# Patient Record
Sex: FEMALE | Race: OTHER
[De-identification: names, ages, dates, MRNs, and addresses within clinical notes are randomized per-mention and may not be internally consistent; named-entity substitution may affect disease eponyms.]

---

## 2018-07-05 ENCOUNTER — HOSPITAL ENCOUNTER (EMERGENCY)
Dept: HOSPITAL 62 - ER | Age: 38
Discharge: HOME | End: 2018-07-05
Payer: MEDICAID

## 2018-07-05 VITALS — SYSTOLIC BLOOD PRESSURE: 125 MMHG | DIASTOLIC BLOOD PRESSURE: 78 MMHG

## 2018-07-05 DIAGNOSIS — S92.052A: Primary | ICD-10-CM
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
## 2018-07-05 DIAGNOSIS — F17.210: ICD-10-CM

## 2018-07-05 PROCEDURE — 99283 EMERGENCY DEPT VISIT LOW MDM: CPT

## 2018-07-05 PROCEDURE — 73630 X-RAY EXAM OF FOOT: CPT

## 2018-07-05 PROCEDURE — 99406 BEHAV CHNG SMOKING 3-10 MIN: CPT

## 2018-07-05 NOTE — ER DOCUMENT REPORT
ED Extremity Problem, Lower





- General


Chief Complaint: Toe Injury


Stated Complaint: LEFT PINKY TOE INJURY


Time Seen by Provider: 07/05/18 16:17


Mode of Arrival: Ambulatory


Information source: Patient


Notes: 





38-year-old female presents to ED for complaint of fifth toe pain after she 

kicked a 10 pound weight yesterday.  She states she is elevated and iced the 

foot with no relief.  Patient alert and oriented respirations regular and 

unlabored speaking in full sentences and walks with a limp.


TRAVEL OUTSIDE OF THE [place] IN LAST 30 DAYS: No





- HPI


Patient complains to provider of: Injury, Pain, Swelling


Location: 5th Toe


Occurred: Yesterday


Where: Home


Onset/Duration: Sudden, Persistent


Quality of pain: Achy, Throbbing


Severity: Moderate


Pain Level: 4


Context: Stubbed


Recent injury: Yes


Associated symptoms: Painful ambulation


Exacerbated by: Hanging down, Movement, Walking


Relieved by: Elevation, Ice, Rest





- Related Data


Allergies/Adverse Reactions: 


 





codeine [Codeine] Allergy (Verified 07/05/18 15:23)


 











Past Medical History





- General


Information source: Patient





- Social History


Smoking Status: Current Every Day Smoker


Cigarette use (# per day): Yes - One half pack per day


Smoking Education Provided: Yes - 4 minutes


Lives with: Family


Family History: Reviewed & Not Pertinent


Patient has suicidal ideation: No


Patient has homicidal ideation: No





- Past Medical History


Cardiac Medical History: Reports: None


Pulmonary Medical History: Reports: None


EENT Medical History: Reports: None


Neurological Medical History: Reports: None


Endocrine Medical History: Reports: None


Renal/ Medical History: Reports: Hx Ovarian Cysts


Malignancy Medical History: Reports: None


GI Medical History: Reports: None


Musculoskeltal Medical History: Reports Hx Musculoskeletal Trauma


Skin Medical History: Reports None


Psychiatric Medical History: Reports: None


Traumatic Medical History: Reports: Hx Fractures - Leg


Infectious Medical History: Reports: None


Past Surgical History: Reports: Hx Gynecologic Surgery - Ovarian cyst





- Immunizations


Hx Diphtheria, Pertussis, Tetanus Vaccination: Yes





Review of Systems





- Review of Systems


Musculoskeletal: Other - Pain swelling and bruising to the fifth toe





Physical Exam





- Vital signs


Vitals: 


 











Temp Pulse Resp BP Pulse Ox


 


 98.3 F   81   16   125/78   97 


 


 07/05/18 15:39  07/05/18 15:39  07/05/18 15:39  07/05/18 15:39  07/05/18 15:39











Interpretation: Normal





- General


General appearance: Appears well, Alert





- HEENT


Head: Normocephalic, Atraumatic


Eyes: Normal


Pupils: PERRL





- Respiratory


Respiratory status: No respiratory distress


Chest status: Nontender


Breath sounds: Normal


Chest palpation: Normal





- Cardiovascular


Rhythm: Regular


Heart sounds: Normal auscultation


Murmur: No





- Abdominal


Inspection: Normal


Distension: No distension


Bowel sounds: Normal


Tenderness: Nontender


Organomegaly: No organomegaly





- Back


Back: Normal, Nontender





- Extremities


General upper extremity: Normal inspection, Nontender, Normal color, Normal ROM

, Normal temperature


General lower extremity: Normal temperature.  No: David's sign


Foot: Tender, Ecchymosis, Edema, No evidence of FB, Tender 5th metatarsal, 

Other - Pain swelling bruising to the fifth toe on the left foot and the 

surrounding area.  No: Laceration, Metatarsal compress. pain, Nail injury, 

Navicular tenderness, Unable to bear weight





- Neurological


Neuro grossly intact: Yes


Cognition: Normal


Orientation: AAOx4


[place] Coma Scale Eye Opening: Spontaneous


[place] Coma Scale Verbal: Oriented


[place] Coma Scale Motor: Obeys Commands


[place] Coma Scale Total: 15


Speech: Normal


Motor strength normal: LUE, RUE, LLE, RLE


Sensory: Normal





- Psychological


Associated symptoms: Normal affect, Normal mood





- Skin


Skin Temperature: Warm


Skin Moisture: Dry


Skin Color: Normal, Ecchymosis - Fifth toe on the left foot and the surrounding 

foot





Course





- Re-evaluation


Re-evalutation: 





07/05/18 23:07


X-ray discussed with patient and written report given to patient.  Patient was 

treated with buddy tape and the fifth toe to the fourth.  Patient was 

encouraged to use elevation ice for her pain.  Patient was given a small 

prescription of narcotics.  Patient is instructed to follow-up with a 

podiatrist for this fractured toe.





- Vital Signs


Vital signs: 


 











Temp Pulse Resp BP Pulse Ox


 


 98.3 F   81   16   125/78   97 


 


 07/05/18 15:39  07/05/18 15:39  07/05/18 15:39  07/05/18 15:39  07/05/18 15:39














- Diagnostic Test


Radiology reviewed: Image reviewed, Reports reviewed





Discharge





- Discharge


Clinical Impression: 


Fracture of fifth toe, left, closed


Qualifiers:


 Encounter type: initial encounter Qualified Code(s): S92.502A - Displaced 

unspecified fracture of left lesser toe(s), initial encounter for closed 

fracture





Condition: Stable


Disposition: HOME, SELF-CARE


Additional Instructions: 


Fractured Toe





     You have fractured your toe.  Although this fracture doesn't need a cast 

or splint, emergency evaluation was needed to assess the straightness of the 

bones and joints.  Reduction ("setting") is necessary for toe fractures which 

are crooked or twisted.  A toe fracture will heal in about three weeks.


     Usually, the fractured toe is taped to the next toe.  The second toe acts 

as a moving splint to protect the broken one.  Ice and elevation help during 

the first 48 hours.  You may need crutches at first if walking is painful.  

When you begin walking, be careful NOT to do things that hurt.  If weight 

bearing is not comfortable within a few days, you may require a special shoe, 

walking boot, or cast.


     Call the doctor or return at once if severe swelling, severe pain, or 

numbness develop in the toe, or if you suspect you may have re-injured it.





Buddy Taping





     Your toes have been taped together -- called "buddy taping."  The good toe 

can act as a moving splint to protect the injured toe.  You will probably need 

to keep the tape in place (replacing it when needed) for about three weeks.  A 

firm shoe over the injured toes is usually a good idea.


     As a general rule, you shouldn't do anything which causes pain to your 

taped toes.  Taping isn't absolute protection, so match your activity to your 

degree of healing.  If you ever suspect that you have re-injured the toe, 

return for re-examination.


     Keep the tape dry.  Constant wetness harms the skin.  Some cotton between 

the toes may help if perspiration is a problem.  Replace the tape as needed 

when it becomes loose, weak, or dirty.  Replace the tape daily if you are 

sweating.


     If the toes swell, discolor, or become numb, loosen the tape. Return here 

if there are problems.





Ibuprofen





     Ibuprofen is an excellent, safe drug for pain control.  In addition, it 

has potent antiinflammatory effects which are beneficial, especially in the 

treatment of injuries, arthritis, or tendonitis. It's best to take ibuprofen 

with food.  Persons with ulcer disease or allergy to aspirin should notify 

their physician of this before taking ibuprofen.


     Take the medication exactly as prescribed.  Don't take additional doses 

unless instructed to do so by your doctor.  If you develop wheezing, shortness 

of breath, hives, faintness, stomach pain, vomiting, or dark black stools, 

return for re-evaluation at once.





ICE & ELEVATION:


     Apply ice packs frequently against the painful area.  Many different 

schedules are recommended, such as "20 minutes on, 20 minutes off" or "one hour 

ice, two hours rest."  If you need to work, you may need to go longer between 

ice treatments.  You should plan to have the area ice packed AT LEAST one-

fourth of the time.


     The ice should be applied over the wrap, tape, or splint, or over a layer 

of cloth -- not directly against the skin.  Some ice bags have a built-in cloth 

and can be put directly on the skin.


     Your injured part should be elevated as much as possible over the next 48 

hours.  Try to keep the injury above the level of the heart. Avoid use of the 

injured area.  Elevation and rest will decrease the swelling.





Oral Narcotic Medication





     You have been given a prescription for pain control.  This medication is a 

narcotic.  It's best taken with food, as nausea can result if taken on an empty 

stomach.


     Don't operate machinery or drive within six hours of taking this 

medication.  Do not combine this medicine with alcohol, or with any medication 

which can cause sedation (such as cold tablets or sleeping pills) unless you 

get permission from the physician.


     Narcotics tend to cause constipation.  If possible, drink plenty of fluids 

and eat a diet high in fiber and fruits.








FOLLOW-UP CARE:


If you have been referred to a physician for follow-up care, call the physician

s office for an appointment as you were instructed or within the next two days.

  If you experience worsening or a significant change in your symptoms, notify 

the physician immediately or return to the Emergency Department at any time for 

re-evaluation.


Prescriptions: 


Hydrocodone/Acetaminophen [Norco 5-325 mg Tablet] 1 tab PO Q6HP PRN #6 tablet


 PRN Reason: 


Ibuprofen 600 mg PO Q6HP PRN #20 tablet


 PRN Reason: 


Forms:  Smoking Cessation Education


Referrals: 


ILANA ARRIAGA DPM [ACTIVE STAFF] - Follow up as needed

## 2018-07-05 NOTE — RADIOLOGY REPORT (SQ)
EXAM DESCRIPTION:  FOOT LEFT COMPLETE



COMPLETED DATE/TIME:  7/5/2018 4:35 pm



REASON FOR STUDY:  pain 5th toe and foot



COMPARISON:  None.



NUMBER OF VIEWS:  Three views.



TECHNIQUE:  AP, lateral and oblique  radiographic images acquired of the left foot.



LIMITATIONS:  None.



FINDINGS:  MINERALIZATION: Normal.

BONES: Faint nondisplaced fracture midshaft of the proximal phalanx of the 5th toe.

JOINTS: No effusions.

SOFT TISSUES: Soft tissue swelling 5th toe.

OTHER: No other significant finding.



IMPRESSION:  Faint nondisplaced fracture proximal phalanx 5th toe.



TECHNICAL DOCUMENTATION:  JOB ID:  [id number]

 2011 [employer]- All Rights Reserved



Reading location - IP/workstation name: [email or web address]

## 2018-08-22 ENCOUNTER — HOSPITAL ENCOUNTER (EMERGENCY)
Dept: HOSPITAL 62 - ER | Age: 38
Discharge: HOME | End: 2018-08-22
Payer: MEDICAID

## 2018-08-22 VITALS — DIASTOLIC BLOOD PRESSURE: 78 MMHG | SYSTOLIC BLOOD PRESSURE: 121 MMHG

## 2018-08-22 DIAGNOSIS — F17.210: ICD-10-CM

## 2018-08-22 DIAGNOSIS — R07.9: ICD-10-CM

## 2018-08-22 DIAGNOSIS — R19.7: ICD-10-CM

## 2018-08-22 DIAGNOSIS — Z88.6: ICD-10-CM

## 2018-08-22 DIAGNOSIS — F41.9: ICD-10-CM

## 2018-08-22 DIAGNOSIS — R00.2: Primary | ICD-10-CM

## 2018-08-22 LAB
ADD MANUAL DIFF: NO
ALBUMIN SERPL-MCNC: 4.5 G/DL (ref 3.5–5)
ALP SERPL-CCNC: 62 U/L (ref 38–126)
ALT SERPL-CCNC: 35 U/L (ref 9–52)
ANION GAP SERPL CALC-SCNC: 14 MMOL/L (ref 5–19)
APPEARANCE UR: (no result)
APTT PPP: YELLOW S
AST SERPL-CCNC: 33 U/L (ref 14–36)
BASOPHILS # BLD AUTO: 0.1 10^3/UL (ref 0–0.2)
BASOPHILS NFR BLD AUTO: 0.7 % (ref 0–2)
BILIRUB DIRECT SERPL-MCNC: 0.2 MG/DL (ref 0–0.4)
BILIRUB SERPL-MCNC: 0.6 MG/DL (ref 0.2–1.3)
BILIRUB UR QL STRIP: NEGATIVE
BUN SERPL-MCNC: 7 MG/DL (ref 7–20)
CALCIUM: 9.6 MG/DL (ref 8.4–10.2)
CHLORIDE SERPL-SCNC: 108 MMOL/L (ref 98–107)
CK MB SERPL-MCNC: 0.9 NG/ML (ref ?–4.55)
CK SERPL-CCNC: 228 U/L (ref 30–135)
CO2 SERPL-SCNC: 21 MMOL/L (ref 22–30)
EOSINOPHIL # BLD AUTO: 0.1 10^3/UL (ref 0–0.6)
EOSINOPHIL NFR BLD AUTO: 1.8 % (ref 0–6)
ERYTHROCYTE [DISTWIDTH] IN BLOOD BY AUTOMATED COUNT: 15.8 % (ref 11.5–14)
FREE T4 (FREE THYROXINE): 0.85 NG/DL (ref 0.78–2.19)
GLUCOSE SERPL-MCNC: 94 MG/DL (ref 75–110)
GLUCOSE UR STRIP-MCNC: NEGATIVE MG/DL
HCT VFR BLD CALC: 43.7 % (ref 36–47)
HGB BLD-MCNC: 14.9 G/DL (ref 12–15.5)
KETONES UR STRIP-MCNC: NEGATIVE MG/DL
LYMPHOCYTES # BLD AUTO: 3.1 10^3/UL (ref 0.5–4.7)
LYMPHOCYTES NFR BLD AUTO: 40.6 % (ref 13–45)
MCH RBC QN AUTO: 27.6 PG (ref 27–33.4)
MCHC RBC AUTO-ENTMCNC: 34.1 G/DL (ref 32–36)
MCV RBC AUTO: 81 FL (ref 80–97)
MONOCYTES # BLD AUTO: 0.5 10^3/UL (ref 0.1–1.4)
MONOCYTES NFR BLD AUTO: 6.4 % (ref 3–13)
NEUTROPHILS # BLD AUTO: 3.8 10^3/UL (ref 1.7–8.2)
NEUTS SEG NFR BLD AUTO: 50.5 % (ref 42–78)
NITRITE UR QL STRIP: NEGATIVE
PH UR STRIP: 6 [PH] (ref 5–9)
PHOSPHATE SERPL-MCNC: 4.2 MG/DL (ref 2.5–4.5)
PLATELET # BLD: 268 10^3/UL (ref 150–450)
POTASSIUM SERPL-SCNC: 4.3 MMOL/L (ref 3.6–5)
PROT SERPL-MCNC: 7.7 G/DL (ref 6.3–8.2)
PROT UR STRIP-MCNC: NEGATIVE MG/DL
RBC # BLD AUTO: 5.41 10^6/UL (ref 3.72–5.28)
SODIUM SERPL-SCNC: 143.3 MMOL/L (ref 137–145)
SP GR UR STRIP: 1.02
T3FREE SERPL-MCNC: 3.77 PG/ML (ref 2.77–5.27)
TOTAL CELLS COUNTED % (AUTO): 100 %
TROPONIN I SERPL-MCNC: < 0.012 NG/ML
TSH SERPL-ACNC: 1.76 UIU/ML (ref 0.47–4.68)
UROBILINOGEN UR-MCNC: NEGATIVE MG/DL (ref ?–2)
WBC # BLD AUTO: 7.6 10^3/UL (ref 4–10.5)

## 2018-08-22 PROCEDURE — 84484 ASSAY OF TROPONIN QUANT: CPT

## 2018-08-22 PROCEDURE — 71046 X-RAY EXAM CHEST 2 VIEWS: CPT

## 2018-08-22 PROCEDURE — 99406 BEHAV CHNG SMOKING 3-10 MIN: CPT

## 2018-08-22 PROCEDURE — 93005 ELECTROCARDIOGRAM TRACING: CPT

## 2018-08-22 PROCEDURE — 84481 FREE ASSAY (FT-3): CPT

## 2018-08-22 PROCEDURE — 82550 ASSAY OF CK (CPK): CPT

## 2018-08-22 PROCEDURE — 85379 FIBRIN DEGRADATION QUANT: CPT

## 2018-08-22 PROCEDURE — 82553 CREATINE MB FRACTION: CPT

## 2018-08-22 PROCEDURE — 80053 COMPREHEN METABOLIC PANEL: CPT

## 2018-08-22 PROCEDURE — 84439 ASSAY OF FREE THYROXINE: CPT

## 2018-08-22 PROCEDURE — 99285 EMERGENCY DEPT VISIT HI MDM: CPT

## 2018-08-22 PROCEDURE — 84100 ASSAY OF PHOSPHORUS: CPT

## 2018-08-22 PROCEDURE — 36415 COLL VENOUS BLD VENIPUNCTURE: CPT

## 2018-08-22 PROCEDURE — 93010 ELECTROCARDIOGRAM REPORT: CPT

## 2018-08-22 PROCEDURE — 84443 ASSAY THYROID STIM HORMONE: CPT

## 2018-08-22 PROCEDURE — 94640 AIRWAY INHALATION TREATMENT: CPT

## 2018-08-22 PROCEDURE — 81001 URINALYSIS AUTO W/SCOPE: CPT

## 2018-08-22 PROCEDURE — 87088 URINE BACTERIA CULTURE: CPT

## 2018-08-22 PROCEDURE — 87086 URINE CULTURE/COLONY COUNT: CPT

## 2018-08-22 PROCEDURE — 81025 URINE PREGNANCY TEST: CPT

## 2018-08-22 PROCEDURE — 85025 COMPLETE CBC W/AUTO DIFF WBC: CPT

## 2018-08-22 PROCEDURE — 83735 ASSAY OF MAGNESIUM: CPT

## 2018-08-22 NOTE — ER DOCUMENT REPORT
ED General





- General


Chief Complaint: Chest Pain


Stated Complaint: CHEST PAIN


Time Seen by Provider: 08/22/18 14:32


Mode of Arrival: Ambulatory


Information source: Patient, Replaced by [facility] Records


Notes: 





38-year-old otherwise healthy  female presents to the emergency 

department with chief complaint of constant chest pressure for the past 3 days.

  Patient states today at 1:30 AM she woke from sleep with chest pressure, 

palpitations and "adrenaline". Denies any radiation, pain worse with lying 

flat. Asssociated diarrhea and loss of appetite.  Patient states diarrhea 

started 3 days prior to arrival.  She states that she has had over 5-6 

nonbloody episodes without associated abdominal pain or nausea.  Patient denies 

sick contacts, recent illness, recent antibiotic use.  She denies recent surgery

, estrogen use, history of PE or DVT.  Current 1/2 ppd smoker, denies drug or 

alcohol use. Denies recent illness, shortness of breath, diaphoresis, LE 

swelling/pain, abdominal pain, nausea, vomiting, syncope, headache.  Patient 

does admit to increased stress with recently being homeless, jobless.  She is 

living in a motel at this time.


TRAVEL OUTSIDE OF THE [place] IN LAST 30 DAYS: No





- HPI


Onset: Other


Onset/Duration: Gradual, Persistent, Worse


Quality of pain: Pressure


Severity: Mild


Associated symptoms: Chest pain, Diarrhea.  denies: Fever, Nausea, Vomiting, 

Shortness of breath


Exacerbated by: Supine


Relieved by: Denies


Similar symptoms previously: No


Recently seen / treated by doctor: No





- Related Data


Allergies/Adverse Reactions: 


 





codeine [Codeine] Allergy (Verified 08/22/18 13:17)


 











Past Medical History





- General


Information source: Patient





- Social History


Smoking Status: Current Every Day Smoker


Cigarette use (# per day): Yes - 10


Chew tobacco use (# tins/day): No


Smoking Education Provided: Yes - 4 minutes of smoking cessation provided


Frequency of alcohol use: Occasional


Drug Abuse: None


Lives with: Family


Family History: Reviewed & Not Pertinent


Patient has suicidal ideation: No


Patient has homicidal ideation: No





- Medical History


Medical History: Negative


Renal/ Medical History: Reports: Hx Ovarian Cysts.  Denies: Hx Peritoneal 

Dialysis


Musculoskeletal Medical History: Reports Hx Musculoskeletal Trauma


Traumatic Medical History: Reports: Hx Fractures - Leg


Past Surgical History: Reports: Hx Gynecologic Surgery - Ovarian cyst





- Immunizations


Hx Diphtheria, Pertussis, Tetanus Vaccination: Yes





Review of Systems





- Review of Systems


Notes: 





REVIEW OF SYSTEMS:


CONSTITUTIONAL :  Denies fever,  chills, or sweats.  Denies recent illness. 

Denies weight loss, recent hospitalizations. 


EENT: Denies visual changes, eye pain.    Denies nasal or sinus congestion or 

discharge.  Denies sore throat, oral lesions, difficulty swallowing.


CARDIOVASCULAR:   Denies lower extremity edema.


RESPIRATORY:  Denies cough, cold, or chest congestion.  Denies shortness of 

breath, wheezing.


GASTROINTESTINAL:  Denies abdominal pain or distention.  Denies nausea, 

vomiting.  Denies blood in vomitus, stools, or per rectum.  Denies black, tarry 

stools.  Denies constipation.  


GENITOURINARY:  Denies difficulty urinating, painful urination,  frequency, 

blood in urine, or  vaginal discharge.


MUSCULOSKELETAL:  Denies back or neck pain or stiffness.  Denies joint pain or 

swelling.


SKIN:   Denies rash, lesions or sores.


HEMATOLOGIC :   Denies easy bruising or bleeding.


LYMPHATIC:  Denies swollen glands.


NEUROLOGICAL:  Denies confusion or altered mental status.  Denies passing out 

or loss of consciousness.  Denies dizziness or lightheadedness.  Denies 

headache.  Denies weakness or paralysis.  Denies problems difficulty with 

ambulation, slurred speech.  Denies sensory loss, numbness, or tingling.  

Denies seizures.


PSYCHIATRIC:  Denies anxiety or stress.  Denies depression, suicidal ideation, 

or homicidal ideation.  Denies visual or auditory hallucinations.








Physical Exam





- Vital signs


Vitals: 


 











Temp Pulse Resp BP Pulse Ox


 


 98.3 F   102 H  18   132/97 H  98 


 


 08/22/18 13:26  08/22/18 13:26  08/22/18 13:26  08/22/18 13:26  08/22/18 13:26














- Notes


Notes: 





PHYSICAL EXAMINATION:





GENERAL: Well-appearing, well-nourished and in no acute distress.





HEAD: Atraumatic, normocephalic.





EYES: Pupils equal round and reactive to light, extraocular movements intact, 

conjunctiva are normal.





ENT: Nares patent, oropharynx clear without exudates.  Moist mucous membranes.





NECK: Normal range of motion, supple without lymphadenopathy





LUNGS: Breath sounds clear to auscultation bilaterally and equal.  No wheezes 

rales or rhonchi.





HEART: Regular rate and rhythm without murmurs





ABDOMEN: Soft, nontender, nondistended abdomen.  No guarding, no rebound.  No 

masses appreciated.





Female : deferred





Musculoskeletal: Normal range of motion, no pitting or edema.  No cyanosis.





NEUROLOGICAL: Cranial nerves grossly intact.  Normal speech, normal gait.  

Normal sensory, motor exams





PSYCH: Normal mood, normal affect.





SKIN: Warm, Dry, normal turgor, no rashes or lesions noted.





Course





- Re-evaluation


Re-evalutation: 





08/22/18 17:50





Laboratory











  08/22/18 08/22/18 08/22/18





  14:20 14:20 14:20


 


WBC  7.6  


 


RBC  5.41 H  


 


Hgb  14.9  


 


Hct  43.7  


 


MCV  81  


 


MCH  27.6  


 


MCHC  34.1  


 


RDW  15.8 H  


 


Plt Count  268  


 


Seg Neutrophils %  50.5  


 


Lymphocytes %  40.6  


 


Monocytes %  6.4  


 


Eosinophils %  1.8  


 


Basophils %  0.7  


 


Absolute Neutrophils  3.8  


 


Absolute Lymphocytes  3.1  


 


Absolute Monocytes  0.5  


 


Absolute Eosinophils  0.1  


 


Absolute Basophils  0.1  


 


D-Dimer   


 


Sodium   143.3 


 


Potassium   4.3 


 


Chloride   108 H 


 


Carbon Dioxide   21 L 


 


Anion Gap   14 


 


BUN   7 


 


Creatinine   0.66 


 


Est GFR ( Amer)   > 60 


 


Est GFR (Non-Af Amer)   > 60 


 


Glucose   94 


 


Calcium   9.6 


 


Phosphorus   


 


Magnesium   


 


Total Bilirubin   0.6 


 


Direct Bilirubin   0.2 


 


Neonat Total Bilirubin   Not Reportable 


 


Neonat Direct Bilirubin   Not Reportable 


 


Neonat Indirect Bili   Not Reportable 


 


AST   33 


 


ALT   35 


 


Alkaline Phosphatase   62 


 


Creatine Kinase   228 H 


 


CK-MB (CK-2)    0.90


 


Troponin I    < 0.012


 


Total Protein   7.7 


 


Albumin   4.5 


 


TSH   


 


Free T4   


 


Free T3 pg/mL   


 


Urine Color   


 


Urine Appearance   


 


Urine pH   


 


Ur Specific Gravity   


 


Urine Protein   


 


Urine Glucose (UA)   


 


Urine Ketones   


 


Urine Blood   


 


Urine Nitrite   


 


Urine Bilirubin   


 


Urine Urobilinogen   


 


Ur Leukocyte Esterase   


 


Urine WBC (Auto)   


 


Urine RBC (Auto)   


 


Urine Bacteria (Auto)   


 


Squamous Epi Cells Auto   


 


Urine Mucus (Auto)   


 


Urine Ascorbic Acid   


 


Urine HCG, Qual   














  08/22/18 08/22/18 08/22/18





  14:20 14:20 14:20


 


WBC   


 


RBC   


 


Hgb   


 


Hct   


 


MCV   


 


MCH   


 


MCHC   


 


RDW   


 


Plt Count   


 


Seg Neutrophils %   


 


Lymphocytes %   


 


Monocytes %   


 


Eosinophils %   


 


Basophils %   


 


Absolute Neutrophils   


 


Absolute Lymphocytes   


 


Absolute Monocytes   


 


Absolute Eosinophils   


 


Absolute Basophils   


 


D-Dimer  0.32  


 


Sodium   


 


Potassium   


 


Chloride   


 


Carbon Dioxide   


 


Anion Gap   


 


BUN   


 


Creatinine   


 


Est GFR ( Amer)   


 


Est GFR (Non-Af Amer)   


 


Glucose   


 


Calcium   


 


Phosphorus    4.2


 


Magnesium    2.1


 


Total Bilirubin   


 


Direct Bilirubin   


 


Neonat Total Bilirubin   


 


Neonat Direct Bilirubin   


 


Neonat Indirect Bili   


 


AST   


 


ALT   


 


Alkaline Phosphatase   


 


Creatine Kinase   


 


CK-MB (CK-2)   


 


Troponin I   


 


Total Protein   


 


Albumin   


 


TSH   1.76 


 


Free T4   0.85 


 


Free T3 pg/mL   3.77 


 


Urine Color   


 


Urine Appearance   


 


Urine pH   


 


Ur Specific Gravity   


 


Urine Protein   


 


Urine Glucose (UA)   


 


Urine Ketones   


 


Urine Blood   


 


Urine Nitrite   


 


Urine Bilirubin   


 


Urine Urobilinogen   


 


Ur Leukocyte Esterase   


 


Urine WBC (Auto)   


 


Urine RBC (Auto)   


 


Urine Bacteria (Auto)   


 


Squamous Epi Cells Auto   


 


Urine Mucus (Auto)   


 


Urine Ascorbic Acid   


 


Urine HCG, Qual   














  08/22/18





  17:00


 


WBC 


 


RBC 


 


Hgb 


 


Hct 


 


MCV 


 


MCH 


 


MCHC 


 


RDW 


 


Plt Count 


 


Seg Neutrophils % 


 


Lymphocytes % 


 


Monocytes % 


 


Eosinophils % 


 


Basophils % 


 


Absolute Neutrophils 


 


Absolute Lymphocytes 


 


Absolute Monocytes 


 


Absolute Eosinophils 


 


Absolute Basophils 


 


D-Dimer 


 


Sodium 


 


Potassium 


 


Chloride 


 


Carbon Dioxide 


 


Anion Gap 


 


BUN 


 


Creatinine 


 


Est GFR ( Amer) 


 


Est GFR (Non-Af Amer) 


 


Glucose 


 


Calcium 


 


Phosphorus 


 


Magnesium 


 


Total Bilirubin 


 


Direct Bilirubin 


 


Neonat Total Bilirubin 


 


Neonat Direct Bilirubin 


 


Neonat Indirect Bili 


 


AST 


 


ALT 


 


Alkaline Phosphatase 


 


Creatine Kinase 


 


CK-MB (CK-2) 


 


Troponin I 


 


Total Protein 


 


Albumin 


 


TSH 


 


Free T4 


 


Free T3 pg/mL 


 


Urine Color  YELLOW


 


Urine Appearance  SLIGHTLY-CLOUDY


 


Urine pH  6.0


 


Ur Specific Gravity  1.020


 


Urine Protein  NEGATIVE


 


Urine Glucose (UA)  NEGATIVE


 


Urine Ketones  NEGATIVE


 


Urine Blood  SMALL H


 


Urine Nitrite  NEGATIVE


 


Urine Bilirubin  NEGATIVE


 


Urine Urobilinogen  NEGATIVE


 


Ur Leukocyte Esterase  SMALL H


 


Urine WBC (Auto)  15


 


Urine RBC (Auto)  3


 


Urine Bacteria (Auto)  TRACE


 


Squamous Epi Cells Auto  4


 


Urine Mucus (Auto)  MANY


 


Urine Ascorbic Acid  NEGATIVE


 


Urine HCG, Qual  NEGATIVE











08/22/18 17:57


Discussed findings of her urinalysis but patient has no urinary symptoms at 

this time.  She is agreeable with holding antibiotics awaiting urine culture.  

Patient will be discharged home in stable condition with a prescription for 

Vistaril.





- Vital Signs


Vital signs: 


 











Temp Pulse Resp BP Pulse Ox


 


 98.3 F   104 H  24 H  116/93 H  98 


 


 08/22/18 13:26  08/22/18 14:37  08/22/18 17:36  08/22/18 17:36  08/22/18 17:36














- Laboratory


Result Diagrams: 


 08/22/18 14:20





 08/22/18 14:20


Laboratory results interpreted by me: 


 











  08/22/18 08/22/18 08/22/18





  14:20 14:20 17:00


 


RBC  5.41 H  


 


RDW  15.8 H  


 


Chloride   108 H 


 


Carbon Dioxide   21 L 


 


Creatine Kinase   228 H 


 


Urine Blood    SMALL H


 


Ur Leukocyte Esterase    SMALL H














- Diagnostic Test


Radiology reviewed: Image reviewed, Reports reviewed





- EKG Interpretation by Me


EKG shows normal: Sinus rhythm


Rate: Tachycardia


Rhythm: NSR


When compared to previous EKG there are: Previous EKG unavailable





Discharge





- Discharge


Clinical Impression: 


 Heart palpitations, Anxiety, Chest pressure





Diarrhea


Qualifiers:


 Diarrhea type: unspecified type Qualified Code(s): R19.7 - Diarrhea, 

unspecified





Condition: Good


Disposition: HOME, SELF-CARE


Instructions:  Chest Pain of Unclear Cause ([facility]), Palpitations (Irregular or 

Rapid Heartrate) ([facility]), Anxiety ([facility])


Additional Instructions: 


Your blood work today including CBC, thyroid hormone, cardiac enzymes were all 

within normal limits.  You have no electrolyte abnormalities.  Your EKG was 

also normal.  Your chest x-ray showed no evidence of infection.


Prescriptions: 


Hydroxyzine Pamoate [Vistaril 50 mg Capsule] 50 mg PO Q8H PRN #15 capsule


 PRN Reason: Anxiety


Forms:  Smoking Cessation Education

## 2018-08-22 NOTE — EKG REPORT
SEVERITY:- OTHERWISE NORMAL ECG -

SINUS TACHYCARDIA

:

Confirmed by: Dashawn Gill 22-Aug-2018 22:14:29

## 2018-08-22 NOTE — RADIOLOGY REPORT (SQ)
EXAM DESCRIPTION:  CHEST 2 VIEWS



COMPLETED DATE/TIME:  8/22/2018 5:34 pm



REASON FOR STUDY:  [id number]



COMPARISON:  None.



EXAM PARAMETERS:  NUMBER OF VIEWS: two views

TECHNIQUE: Digital Frontal and Lateral radiographic views of the chest acquired.

RADIATION DOSE: NA

LIMITATIONS: none



FINDINGS:  LUNGS AND PLEURA: No opacities, masses or pneumothorax. No pleural effusion.

MEDIASTINUM AND HILAR STRUCTURES: No masses or contour abnormalities.

HEART AND VASCULAR STRUCTURES: Heart normal size.  No evidence for failure.

BONES: No acute findings.

HARDWARE: None in the chest.

OTHER: No other significant finding.



IMPRESSION:  NO ACUTE RADIOGRAPHIC FINDING IN THE CHEST.



TECHNICAL DOCUMENTATION:  JOB ID:  [id number]

 2011 [employer]- All Rights Reserved



Reading location - IP/workstation name: [email or web address]

## 2018-08-22 NOTE — ER DOCUMENT REPORT
ED Medical Screen (RME)





- General


Chief Complaint: Chest Pain


Stated Complaint: CHEST PAIN


Time Seen by Provider: 08/22/18 14:32


Mode of Arrival: Ambulatory


Information source: Patient


Notes: 





This is a 38-year-old female who presents to the emergency room with 

nonradiating chest discomfort for the past 3 days which is been constant.  

Patient states that she woke up with the pain Monday morning.  She denies any 

strenuous activity over the weekend.  Patient denies any exacerbating or 

relieving factors.  Patient is a smoker half pack per day.  She denies any 

exertional worsening of pain.  She states that it occurs at any time.  She has 

no recent surgery or recent immobilization, no history of VTE, no hemoptysis, 

no malignancy, she denies any symptoms of lower extremity pain or swelling.


TRAVEL OUTSIDE OF THE [place] IN LAST 30 DAYS: No





- Related Data


Allergies/Adverse Reactions: 


 





codeine [Codeine] Allergy (Verified 08/22/18 13:17)


 











Past Medical History





- Social History


Chew tobacco use (# tins/day): No


Frequency of alcohol use: Occasional


Drug Abuse: None


Renal/ Medical History: Reports: Hx Ovarian Cysts.  Denies: Hx Peritoneal 

Dialysis


Musculoskeltal Medical History: Reports Hx Musculoskeletal Trauma


Traumatic Medical History: Reports: Hx Fractures - Leg


Past Surgical History: Reports: Hx Gynecologic Surgery - Ovarian cyst





- Immunizations


Hx Diphtheria, Pertussis, Tetanus Vaccination: Yes





Physical Exam





- Vital signs


Vitals: 





 











Temp Pulse Resp BP Pulse Ox


 


 98.3 F   102 H  18   132/97 H  98 


 


 08/22/18 13:26  08/22/18 13:26  08/22/18 13:26  08/22/18 13:26  08/22/18 13:26














Course





- Vital Signs


Vital signs: 





 











Temp Pulse Resp BP Pulse Ox


 


 98.3 F   102 H  18   132/97 H  98 


 


 08/22/18 13:26  08/22/18 13:26  08/22/18 13:26  08/22/18 13:26  08/22/18 13:26